# Patient Record
Sex: FEMALE | Race: WHITE | HISPANIC OR LATINO | Employment: UNEMPLOYED | URBAN - METROPOLITAN AREA
[De-identification: names, ages, dates, MRNs, and addresses within clinical notes are randomized per-mention and may not be internally consistent; named-entity substitution may affect disease eponyms.]

---

## 2021-07-05 ENCOUNTER — HOSPITAL ENCOUNTER (EMERGENCY)
Facility: HOSPITAL | Age: 56
Discharge: HOME | End: 2021-07-05
Attending: EMERGENCY MEDICINE
Payer: COMMERCIAL

## 2021-07-05 ENCOUNTER — APPOINTMENT (EMERGENCY)
Dept: RADIOLOGY | Facility: HOSPITAL | Age: 56
End: 2021-07-05
Payer: COMMERCIAL

## 2021-07-05 VITALS
OXYGEN SATURATION: 99 % | RESPIRATION RATE: 18 BRPM | DIASTOLIC BLOOD PRESSURE: 69 MMHG | SYSTOLIC BLOOD PRESSURE: 141 MMHG | HEART RATE: 64 BPM

## 2021-07-05 DIAGNOSIS — S90.121A CONTUSION OF RIGHT LESSER TOE(S) W/O DAMAGE TO NAIL, INIT: Primary | ICD-10-CM

## 2021-07-05 PROCEDURE — 99284 EMERGENCY DEPT VISIT MOD MDM: CPT | Performed by: PHYSICIAN ASSISTANT

## 2021-07-05 PROCEDURE — 99283 EMERGENCY DEPT VISIT LOW MDM: CPT

## 2021-07-05 PROCEDURE — 73620 X-RAY EXAM OF FOOT: CPT

## 2021-07-05 NOTE — ED PROVIDER NOTES
History  Chief Complaint   Patient presents with    Foot Injury     Pt presents to ED after dropping air mattress pump onto right foot second toe at 2300  Patient is a 78-year-old female presents emergency department complaints of right 2nd toe pain  Patient states around 11:00 p m  She dropped at air mattress air pump on her foot  States that she had pain in her right 2nd toe  None       Past Medical History:   Diagnosis Date    Asthma     Diabetes mellitus ([address] )     High cholesterol        Past Surgical History:   Procedure Laterality Date     SECTION         History reviewed  No pertinent family history  I have reviewed and agree with the history as documented  E-Cigarette/Vaping     E-Cigarette/Vaping Substances     Social History     Tobacco Use    Smoking status: Never Smoker    Smokeless tobacco: Never Used   Substance Use Topics    Alcohol use: Not on file    Drug use: Not on file       Review of Systems   Constitutional: Negative for fever  Respiratory: Negative for shortness of breath  Cardiovascular: Negative for chest pain  Gastrointestinal: Negative for abdominal pain  Musculoskeletal: Positive for arthralgias  All other systems reviewed and are negative  Physical Exam  Physical Exam  Vitals reviewed  Constitutional:       Appearance: Normal appearance  HENT:      Head: Normocephalic and atraumatic  Right Ear: Tympanic membrane, ear canal and external ear normal       Left Ear: Tympanic membrane, ear canal and external ear normal       Nose: Nose normal       Mouth/Throat:      Mouth: Mucous membranes are moist    Eyes:      Extraocular Movements: Extraocular movements intact  Conjunctiva/sclera: Conjunctivae normal       Pupils: Pupils are equal, round, and reactive to light  Cardiovascular:      Rate and Rhythm: Normal rate and regular rhythm  Pulses: Normal pulses  Heart sounds: Normal heart sounds     Pulmonary: Effort: Pulmonary effort is normal       Breath sounds: Normal breath sounds  Abdominal:      General: Bowel sounds are normal       Palpations: Abdomen is soft  Musculoskeletal:        Feet:    Skin:     General: Skin is warm  Capillary Refill: Capillary refill takes less than 2 seconds  Neurological:      General: No focal deficit present  Mental Status: She is alert and oriented to person, place, and time  Psychiatric:         Mood and Affect: Mood normal          Behavior: Behavior normal          Thought Content: Thought content normal          Judgment: Judgment normal          Vital Signs  ED Triage Vitals [07/05/21 0032]   Temp Pulse Respirations Blood Pressure SpO2   -- 64 18 141/69 99 %      Temp src Heart Rate Source Patient Position - Orthostatic VS BP Location FiO2 (%)   -- Monitor Sitting Right arm --      Pain Score       --           Vitals:    07/05/21 0032   BP: 141/69   Pulse: 64   Patient Position - Orthostatic VS: Sitting         Visual Acuity      ED Medications  Medications - No data to display    Diagnostic Studies  Results Reviewed     None                 XR foot 2 views RIGHT   ED Interpretation by Albin Eaton PA-C (07/05 0056)   Neg for fx                 Procedures  Procedures         ED Course                             SBIRT 22yo+      Most Recent Value   SBIRT (25 yo +)   In order to provide better care to our patients, we are screening all of our patients for alcohol and drug use  Would it be okay to ask you these screening questions? Yes Filed at: 07/05/2021 1397   Initial Alcohol Screen: US AUDIT-C    1  How often do you have a drink containing alcohol?  0 Filed at: 07/05/2021 0042   2  How many drinks containing alcohol do you have on a typical day you are drinking? 0 Filed at: 07/05/2021 0042   3a  Male UNDER 65: How often do you have five or more drinks on one occasion? 0 Filed at: 07/05/2021 0042   3b  FEMALE Any Age, or MALE 65+:  How often do you have 4 or more drinks on one occassion? 0 Filed at: 07/05/2021 0042   Audit-C Score  0 Filed at: 07/05/2021 3087   SHAHZAD: How many times in the past year have you    Used an illegal drug or used a prescription medication for non-medical reasons? Never Filed at: 07/05/2021 0042                    [facility]  Number of Diagnoses or Management Options  Contusion of right lesser toe(s) w/o damage to nail, init  Diagnosis management comments: Patient is a 17-year-old female presents emergency department complaints of right 2nd toe pain  Patient states around 11:00 p m  She dropped at air mattress air pump on her foot  States that she had pain in her right 2nd toe  On examination right lower extremity, she is neurovascular intact  There is test palpation at the base of the 2nd toe  There is some mild soft tissue swelling noted  Remainder of exam is unremarkable  X-ray images right hand reviewed and does not show any bony abnormality  Findings discussed patient  Recommend Tylenol or Motrin for pain relief  Recommend continued icing  Return parameters were discussed  Patient stable for discharge  Amount and/or Complexity of Data Reviewed  Tests in the radiology section of CPT®: ordered and reviewed    Risk of Complications, Morbidity, and/or Mortality  Presenting problems: low  Diagnostic procedures: low  Management options: low    Patient Progress  Patient progress: stable      Disposition  Final diagnoses:   Contusion of right lesser toe(s) w/o damage to nail, init     Time reflects when diagnosis was documented in both MDM as applicable and the Disposition within this note     Time User Action Codes Description Comment    7/5/2021 12:55 AM Sims Nageotte Add [P80 121A] Contusion of right lesser toe(s) w/o damage to nail, init       ED Disposition     ED Disposition Condition Date/Time Comment    Discharge Good Mon Jul 5, 2021 1200 Grant Jones discharge to home/self care              Follow-up Information     Follow up With Specialties Details Why Contact Info    PCP   If symptoms worsen           There are no discharge medications for this patient  No discharge procedures on file      PDMP Review     None          ED Provider  Electronically Signed by           Yomi Don PA-C  07/05/21 8064

## 2022-12-05 ENCOUNTER — HOSPITAL ENCOUNTER (EMERGENCY)
Facility: HOSPITAL | Age: 57
Discharge: HOME | End: 2022-12-05
Attending: EMERGENCY MEDICINE

## 2022-12-05 ENCOUNTER — APPOINTMENT (EMERGENCY)
Dept: RADIOLOGY | Facility: HOSPITAL | Age: 57
End: 2022-12-05

## 2022-12-05 VITALS
SYSTOLIC BLOOD PRESSURE: 125 MMHG | TEMPERATURE: 98 F | OXYGEN SATURATION: 99 % | BODY MASS INDEX: 27.56 KG/M2 | RESPIRATION RATE: 14 BRPM | DIASTOLIC BLOOD PRESSURE: 67 MMHG | WEIGHT: 146 LBS | HEIGHT: 61 IN | HEART RATE: 59 BPM

## 2022-12-05 DIAGNOSIS — R00.2 PALPITATIONS: Primary | ICD-10-CM

## 2022-12-05 LAB
ANION GAP SERPL CALCULATED.3IONS-SCNC: 2 MMOL/L (ref 4–13)
BASOPHILS # BLD AUTO: 0.04 THOUSANDS/ÂΜL (ref 0–0.1)
BASOPHILS NFR BLD AUTO: 1 % (ref 0–1)
BUN SERPL-MCNC: 18 MG/DL (ref 5–25)
CALCIUM SERPL-MCNC: 9.6 MG/DL (ref 8.3–10.1)
CARDIAC TROPONIN I PNL SERPL HS: <2 NG/L
CHLORIDE SERPL-SCNC: 100 MMOL/L (ref 96–108)
CO2 SERPL-SCNC: 30 MMOL/L (ref 21–32)
CREAT SERPL-MCNC: 0.71 MG/DL (ref 0.6–1.3)
EOSINOPHIL # BLD AUTO: 0.15 THOUSAND/ÂΜL (ref 0–0.61)
EOSINOPHIL NFR BLD AUTO: 3 % (ref 0–6)
ERYTHROCYTE [DISTWIDTH] IN BLOOD BY AUTOMATED COUNT: 12.2 % (ref 11.6–15.1)
GFR SERPL CREATININE-BSD FRML MDRD: 93 ML/MIN/1.73SQ M
GLUCOSE SERPL-MCNC: 147 MG/DL (ref 65–140)
HCT VFR BLD AUTO: 40.1 % (ref 34.8–46.1)
HGB BLD-MCNC: 13.1 G/DL (ref 11.5–15.4)
IMM GRANULOCYTES # BLD AUTO: 0.01 THOUSAND/UL (ref 0–0.2)
IMM GRANULOCYTES NFR BLD AUTO: 0 % (ref 0–2)
LYMPHOCYTES # BLD AUTO: 2.35 THOUSANDS/ÂΜL (ref 0.6–4.47)
LYMPHOCYTES NFR BLD AUTO: 42 % (ref 14–44)
MCH RBC QN AUTO: 30.9 PG (ref 26.8–34.3)
MCHC RBC AUTO-ENTMCNC: 32.7 G/DL (ref 31.4–37.4)
MCV RBC AUTO: 95 FL (ref 82–98)
MONOCYTES # BLD AUTO: 0.44 THOUSAND/ÂΜL (ref 0.17–1.22)
MONOCYTES NFR BLD AUTO: 8 % (ref 4–12)
NEUTROPHILS # BLD AUTO: 2.57 THOUSANDS/ÂΜL (ref 1.85–7.62)
NEUTS SEG NFR BLD AUTO: 46 % (ref 43–75)
NRBC BLD AUTO-RTO: 0 /100 WBCS
PLATELET # BLD AUTO: 204 THOUSANDS/UL (ref 149–390)
PMV BLD AUTO: 12.8 FL (ref 8.9–12.7)
POTASSIUM SERPL-SCNC: 3.5 MMOL/L (ref 3.5–5.3)
RBC # BLD AUTO: 4.24 MILLION/UL (ref 3.81–5.12)
SODIUM SERPL-SCNC: 132 MMOL/L (ref 135–147)
TSH SERPL DL<=0.05 MIU/L-ACNC: 5.53 UIU/ML (ref 0.45–4.5)
WBC # BLD AUTO: 5.56 THOUSAND/UL (ref 4.31–10.16)

## 2022-12-05 RX ORDER — SODIUM CHLORIDE 9 MG/ML
3 INJECTION INTRAVENOUS
Status: DISCONTINUED | OUTPATIENT
Start: 2022-12-05 | End: 2022-12-06 | Stop reason: HOSPADM

## 2022-12-06 LAB
ATRIAL RATE: 67 BPM
ATRIAL RATE: 71 BPM
P AXIS: 74 DEGREES
P AXIS: 77 DEGREES
PR INTERVAL: 158 MS
PR INTERVAL: 164 MS
QRS AXIS: 72 DEGREES
QRS AXIS: 79 DEGREES
QRSD INTERVAL: 74 MS
QRSD INTERVAL: 74 MS
QT INTERVAL: 408 MS
QT INTERVAL: 546 MS
QTC INTERVAL: 431 MS
QTC INTERVAL: 593 MS
T WAVE AXIS: 77 DEGREES
T WAVE AXIS: 89 DEGREES
VENTRICULAR RATE: 67 BPM
VENTRICULAR RATE: 71 BPM

## 2022-12-06 NOTE — ED PROVIDER NOTES
History  Chief Complaint   Patient presents with   • Palpitations     Pt reports she started with palpitations intermittently 2 weeks ago, pt reports they got worse today, also has SOB with the episodes of palpitations  Denies chest pain     57-year-old female presenting here with a chief complaint of palpitations intermittently over the last 2 weeks  No shortness of breath or chest pain associated with this  No nausea no vomiting no diaphoresis  None       Past Medical History:   Diagnosis Date   • Asthma    • Diabetes mellitus ([facility] [address] )    • High cholesterol        Past Surgical History:   Procedure Laterality Date   •  SECTION         History reviewed  No pertinent family history  I have reviewed and agree with the history as documented  E-Cigarette/Vaping     E-Cigarette/Vaping Substances     Social History     Tobacco Use   • Smoking status: Never   • Smokeless tobacco: Never   Substance Use Topics   • Alcohol use: Not Currently   • Drug use: Not Currently       Review of Systems   Constitutional: Negative for diaphoresis, fatigue and fever  HENT: Negative for congestion, ear pain, nosebleeds and sore throat  Eyes: Negative for photophobia, pain, discharge and visual disturbance  Respiratory: Negative for cough, choking, chest tightness, shortness of breath and wheezing  Cardiovascular: Positive for palpitations  Negative for chest pain  Gastrointestinal: Negative for abdominal distention, abdominal pain, diarrhea and vomiting  Genitourinary: Negative for dysuria, flank pain and frequency  Musculoskeletal: Negative for back pain, gait problem and joint swelling  Skin: Negative for color change and rash  Neurological: Negative for dizziness, syncope and headaches  Psychiatric/Behavioral: Negative for behavioral problems and confusion  The patient is not nervous/anxious  All other systems reviewed and are negative        Physical Exam  Physical Exam  Vitals and nursing note reviewed  Constitutional:       General: She is not in acute distress  Appearance: She is well-developed and well-nourished  She is not ill-appearing, toxic-appearing or sickly-appearing  HENT:      Head: Normocephalic and atraumatic  Mouth/Throat:      Mouth: Mucous membranes are normal       Dentition: Normal dentition  Eyes:      General:         Right eye: No discharge  Left eye: No discharge  Extraocular Movements: EOM normal    Cardiovascular:      Rate and Rhythm: Normal rate and regular rhythm  Pulmonary:      Effort: Pulmonary effort is normal  No accessory muscle usage or respiratory distress  Abdominal:      General: There is no distension  Tenderness: There is no guarding  Musculoskeletal:         General: No edema  Normal range of motion  Cervical back: Normal range of motion and neck supple  Skin:     General: Skin is warm and dry  Neurological:      Mental Status: She is alert and oriented to person, place, and time  Coordination: Coordination normal    Psychiatric:         Mood and Affect: Mood and affect normal          Behavior: Behavior is cooperative           Vital Signs  ED Triage Vitals [12/05/22 2034]   Temperature Pulse Respirations Blood Pressure SpO2   98 °F (36 7 °C) 71 18 144/66 100 %      Temp Source Heart Rate Source Patient Position - Orthostatic VS BP Location FiO2 (%)   Tympanic Monitor Sitting Left arm --      Pain Score       --           Vitals:    12/05/22 2034 12/05/22 2130   BP: 144/66 125/67   Pulse: 71 59   Patient Position - Orthostatic VS: Sitting Sitting         Visual Acuity      ED Medications  Medications   sodium chloride (PF) 0 9 % injection 3 mL (has no administration in time range)       Diagnostic Studies  Results Reviewed     Procedure Component Value Units Date/Time    HS Troponin I 4hr [[id number]]     Lab Status: No result Specimen: Blood     HS Troponin 0hr (reflex protocol) [[id number]]  (Normal) Collected: 12/05/22 2117    Lab Status: Final result Specimen: Blood from Arm, Left Updated: 12/05/22 2149     hs TnI 0hr <2 ng/L     HS Troponin I 2hr [[id number]]     Lab Status: No result Specimen: Blood     Basic metabolic panel [[id number]]  (Abnormal) Collected: 12/05/22 2117    Lab Status: Final result Specimen: Blood from Arm, Left Updated: 12/05/22 2149     Sodium 132 mmol/L      Potassium 3 5 mmol/L      Chloride 100 mmol/L      CO2 30 mmol/L      ANION GAP 2 mmol/L      BUN 18 mg/dL      Creatinine 0 71 mg/dL      Glucose 147 mg/dL      Calcium 9 6 mg/dL      eGFR 93 ml/min/1 73sq m     Narrative:      [place] guidelines for Chronic Kidney Disease (CKD):   •  Stage 1 with normal or high GFR (GFR > 90 mL/min/1 73 square meters)  •  Stage 2 Mild CKD (GFR = 60-89 mL/min/1 73 square meters)  •  Stage 3A Moderate CKD (GFR = 45-59 mL/min/1 73 square meters)  •  Stage 3B Moderate CKD (GFR = 30-44 mL/min/1 73 square meters)  •  Stage 4 Severe CKD (GFR = 15-29 mL/min/1 73 square meters)  •  Stage 5 End Stage CKD (GFR <15 mL/min/1 73 square meters)  Note: GFR calculation is accurate only with a steady state creatinine    TSH [[id number]]  (Abnormal) Collected: 12/05/22 2117    Lab Status: Final result Specimen: Blood from Arm, Left Updated: 12/05/22 2149     TSH 3RD [place] Rad 5 530 uIU/mL     Narrative:      Patients undergoing fluorescein dye angiography may retain small amounts of fluorescein in the body for 48-72 hours post procedure  Samples containing fluorescein can produce falsely depressed TSH values  If the patient had this procedure,a specimen should be resubmitted post fluorescein clearance        CBC and differential [[id number]]  (Abnormal) Collected: 12/05/22 2117    Lab Status: Final result Specimen: Blood from Arm, Left Updated: 12/05/22 2124     WBC 5 56 Thousand/uL      RBC 4 24 Million/uL      Hemoglobin 13 1 g/dL      Hematocrit 40 1 %      MCV 95 fL      MCH 30 9 pg MCHC 32 7 g/dL      RDW 12 2 %      MPV 12 8 fL      Platelets 201 Thousands/uL      nRBC 0 /100 WBCs      Neutrophils Relative 46 %      Immat GRANS % 0 %      Lymphocytes Relative 42 %      Monocytes Relative 8 %      Eosinophils Relative 3 %      Basophils Relative 1 %      Neutrophils Absolute 2 57 Thousands/µL      Immature Grans Absolute 0 01 Thousand/uL      Lymphocytes Absolute 2 35 Thousands/µL      Monocytes Absolute 0 44 Thousand/µL      Eosinophils Absolute 0 15 Thousand/µL      Basophils Absolute 0 04 Thousands/µL                  X-ray chest 1 view portable    (Results Pending)              Procedures  Procedures         ED Course             HEART Risk Score    Flowsheet Row Most Recent Value   Heart Score Risk Calculator    History 0 Filed at: 12/05/2022 2328   ECG 0 Filed at: 12/05/2022 2328   Age 1 Filed at: 12/05/2022 2328   Risk Factors 0 Filed at: 12/05/2022 2328   Troponin 0 Filed at: 12/05/2022 2328   HEART Score 1 Filed at: 12/05/2022 2328                                      MDM  Number of Diagnoses or Management Options  Palpitations: new and requires workup     Amount and/or Complexity of Data Reviewed  Clinical lab tests: ordered and reviewed  Tests in the radiology section of CPT®: ordered and reviewed  Independent visualization of images, tracings, or specimens: yes    Patient Progress  Patient progress: stable      Disposition  Final diagnoses:   Palpitations     Time reflects when diagnosis was documented in both MDM as applicable and the Disposition within this note     Time User Action Codes Description Comment    12/5/2022  9:56 PM Rich Iglesias Add [R00 2] Palpitations       ED Disposition     ED Disposition   Discharge    Condition   Stable    Date/Time   Mon Dec 5, 2022  9:56 PM    Reny Hernandez discharge to home/self care                 Follow-up Information     Follow up With Specialties Details Why Contact Info Additional Information    ZEE Bueno  Schedule an appointment as soon as possible for a visit  For Continued Evaluation [address]  [facility] [address]  [phone]       [address] [facility] Emergency Department Emergency Medicine  As needed [address] Emergency Department, [address] [place] [address], [facility]          There are no discharge medications for this patient            PDMP Review     None          ED Provider  Electronically Signed by           AUDREY Roberson  12/05/22 5486

## 2024-09-09 ENCOUNTER — OFFICE VISIT (OUTPATIENT)
Age: 59
End: 2024-09-09
Payer: COMMERCIAL

## 2024-09-09 VITALS
BODY MASS INDEX: 27.96 KG/M2 | HEART RATE: 67 BPM | SYSTOLIC BLOOD PRESSURE: 125 MMHG | WEIGHT: 148 LBS | RESPIRATION RATE: 18 BRPM | DIASTOLIC BLOOD PRESSURE: 56 MMHG | TEMPERATURE: 97.4 F | OXYGEN SATURATION: 100 %

## 2024-09-09 DIAGNOSIS — J45.21 MILD INTERMITTENT ASTHMA WITH ACUTE EXACERBATION: Primary | ICD-10-CM

## 2024-09-09 PROCEDURE — 99213 OFFICE O/P EST LOW 20 MIN: CPT | Performed by: PHYSICIAN ASSISTANT

## 2024-09-09 RX ORDER — FLUTICASONE PROPIONATE 110 UG/1
2 AEROSOL, METERED RESPIRATORY (INHALATION) 2 TIMES DAILY
Qty: 36 G | Refills: 3 | Status: SHIPPED | OUTPATIENT
Start: 2024-09-09

## 2024-09-09 RX ORDER — OLOPATADINE HYDROCHLORIDE 1 MG/ML
1 SOLUTION/ DROPS OPHTHALMIC 2 TIMES DAILY
COMMUNITY
Start: 2024-05-08 | End: 2025-05-08

## 2024-09-09 RX ORDER — FENOFIBRATE 145 MG/1
145 TABLET, COATED ORAL DAILY
COMMUNITY
Start: 2024-05-08

## 2024-09-09 RX ORDER — CETIRIZINE HYDROCHLORIDE 10 MG/1
10 TABLET ORAL DAILY
COMMUNITY
Start: 2024-05-08 | End: 2025-05-08

## 2024-09-09 RX ORDER — ALBUTEROL SULFATE 90 UG/1
2 AEROSOL, METERED RESPIRATORY (INHALATION) EVERY 6 HOURS PRN
Qty: 8 G | Refills: 3 | Status: SHIPPED | OUTPATIENT
Start: 2024-09-09

## 2024-09-09 RX ORDER — METFORMIN HCL 500 MG
1000 TABLET, EXTENDED RELEASE 24 HR ORAL
COMMUNITY
Start: 2024-08-20 | End: 2025-08-20

## 2024-09-09 RX ORDER — MELOXICAM 15 MG/1
TABLET ORAL EVERY 24 HOURS
COMMUNITY

## 2024-09-09 RX ORDER — FLUTICASONE PROPIONATE AND SALMETEROL 250; 50 UG/1; UG/1
1 POWDER RESPIRATORY (INHALATION)
COMMUNITY
Start: 2024-08-20

## 2024-09-09 RX ORDER — METHYLPREDNISOLONE 4 MG
TABLET, DOSE PACK ORAL
Qty: 21 TABLET | Refills: 0 | Status: SHIPPED | OUTPATIENT
Start: 2024-09-09

## 2024-09-09 RX ORDER — GLIMEPIRIDE 1 MG/1
1 TABLET ORAL
COMMUNITY
Start: 2024-05-16 | End: 2025-05-16

## 2024-09-09 RX ORDER — ALBUTEROL SULFATE 90 UG/1
2 AEROSOL, METERED RESPIRATORY (INHALATION) EVERY 6 HOURS PRN
COMMUNITY

## 2024-09-09 NOTE — PATIENT INSTRUCTIONS
"Patient Education     Asthma in adults   The Basics   Written by the doctors and editors at [facility]   What is asthma? -- Asthma is a condition that can make it hard to breathe. Asthma symptoms can be mild or severe. They can come and go. An asthma \"attack\" is when symptoms start suddenly. This happens when the airways in the lungs become more narrow and inflamed (figure 1).  Asthma can run in families.  What are the symptoms of asthma? -- Asthma symptoms can include:   Wheezing or noisy breathing   Coughing   Tight feeling in the chest   Shortness of breath  Symptoms can happen each day, each week, or less often. Symptoms can range from mild to severe. Although it is rare, an asthma attack can sometimes even lead to death.  Is there a test for asthma? -- Yes. Your doctor will ask you about your symptoms and have you do a breathing test to check how your lungs are working.  If your doctor thinks that allergies might be making your asthma worse, they might suggest allergy testing. This can include skin tests or blood tests.  How is asthma treated? -- Asthma is treated with different types of medicines. The medicines can be inhalers, liquids, or pills. Your doctor will prescribe medicine based on how often you have symptoms and how serious your symptoms are. There are 2 main types of asthma medicines:   Quick-relief medicines stop symptoms quickly, in 5 to 15 minutes. Almost everyone with asthma carries a quick-relief inhaler with them. People use these medicines whenever they have asthma symptoms. Most people need these medicines 1 or 2 times a week, or less often. But when asthma symptoms get worse, more doses might be needed.   Long-term controller medicines control asthma and help prevent future attacks. People who get asthma symptoms more than 2 times a week should use a controller medicine every day.  Some medicines can work as both a controller medicine and a quick-relief medicine. These are taken once or twice " "a day as controller medicines. They can also be used for quick relief.  It is very important to take all of the medicines the doctor prescribes, exactly how you are supposed to take them. You might have to take medicines a few times a day. Your doctor, nurse, or pharmacist will show you the right way to use your inhaler(s).  If your symptoms get much worse all of a sudden, use your quick-relief medicine and contact your doctor or nurse. You might need to go to the hospital for treatment.  What is an asthma action plan? -- An asthma action plan is a list of instructions that tell you (form 1):   Which medicines to use each day at home   Which medicines to take if your symptoms get worse   When to get help or call for an ambulance  If you have frequent or severe asthma symptoms, your doctor might suggest that you have an asthma action plan. If so, you and your doctor will work together to make one. As part of your action plan, you might need to use something called a \"peak flow meter.\" Breathing into this device will show how your lungs are working. Your doctor will show you the right way to use your peak flow meter.  Can asthma symptoms be prevented? -- There are things that you can do to help prevent asthma attacks. Your doctor or nurse can talk to you about what is most important for you.  In general, you can:   Avoid \"triggers\" - These are things that make your symptoms worse. Common triggers include smoke, air pollution, dust, mold, pollen, strong chemicals or smells, and very cold or dry air. For some people, being around certain animals can trigger symptoms. Exercise and stress can also be triggers.  Some adults with asthma have worse symptoms if they take aspirin or medicines called NSAIDs. NSAIDs include ibuprofen (sample brand names: Advil, Motrin) and naproxen (sample brand names: Aleve, Naprosyn). Ask your doctor if you need to avoid these medicines.  If you can't avoid certain triggers, talk with your " "doctor about what you can do. For example, you might need to take an extra dose of your quick-relief inhaler medicine before you exercise or are around things you are allergic to.   Lower your risk of getting sick - Some infections can make asthma symptoms worse. These include the common cold, the flu, and coronavirus disease 2019 (\"COVID-19\").  It's important to get the COVID-19 vaccine. This will lower the risk of severe illness if you do get COVID-19. You should also get a flu shot every year. Plus, some people need to get a vaccine to help prevent pneumonia.  If you think that you might have an infection, tell your doctor or nurse. They can help you figure out if you need treatment.   Make sure that you know how and when to take your medicines - If you take controller medicines, follow all instructions to help prevent symptoms. You should also make sure that you know how and when to use your quick-relief medicine.   See your doctor or nurse regularly - If you need asthma medicine every day, you should see your doctor or nurse at least every 6 months. At these appointments, they will ask about your symptoms, check how well your lungs are working, and talk about your treatment plan.  What if I want to get pregnant? -- If you want to get pregnant, talk to your doctor about how to control your asthma. Keeping your asthma well controlled is important for the health of your baby. Most asthma medicines are safe to take if you are pregnant.  When should I call the doctor? -- Call for an ambulance (in the [place] and [place], call 9-1-1) if you have severe symptoms like:   You have so much trouble breathing that you cannot talk.   Your lips or fingernails turn gray or blue.  Call your doctor or nurse if:   You have an asthma attack and the symptoms do not improve, or get worse, after using a quick-relief medicine.   You need to use your quick-relief medicine more than 2 times a week.   You cannot do your normal activities " because of your asthma symptoms.   You have any questions about your medicines.  All topics are updated as new evidence becomes available and our peer review process is complete.  This topic retrieved from [employer] on: Mar 29, 2024.  Topic 52061 Version 24.0  Release: 32.2.4 - C32.87  © 2024 UpToDate, Inc. and/or its affiliates. All rights reserved.  figure 1: Child with asthma     During an asthma attack or flare-up, the muscles around the airways tighten (constrict), and the lining of the airways gets inflamed. Then, mucus builds up. All of this makes it hard to breathe.  Graphic 81023 Version 10.0  form 1: Asthma action plan (adult)     Graphic 962757 Version 1.0  Consumer Information Use and Disclaimer   Disclaimer: This generalized information is a limited summary of diagnosis, treatment, and/or medication information. It is not meant to be comprehensive and should be used as a tool to help the user understand and/or assess potential diagnostic and treatment options. It does NOT include all information about conditions, treatments, medications, side effects, or risks that may apply to a specific patient. It is not intended to be medical advice or a substitute for the medical advice, diagnosis, or treatment of a health care provider based on the health care provider's examination and assessment of a patient's specific and unique circumstances. Patients must speak with a health care provider for complete information about their health, medical questions, and treatment options, including any risks or benefits regarding use of medications. This information does not endorse any treatments or medications as safe, effective, or approved for treating a specific patient. UpToDate, Inc. and its affiliates disclaim any warranty or liability relating to this information or the use thereof.The use of this information is governed by the Terms of Use, available at  https://www.[employer].com/en/know/clinical-effectiveness-terms. 2024© [employer] and its affiliates and/or licensors. All rights reserved.  Copyright   © 2024 [employer] and/or its affiliates. All rights reserved.

## 2024-09-09 NOTE — PROGRESS NOTES
[facility]        NAME: Fioldaliza Reyes is a 59 y.o. female  : 1965    MRN: [id number]  DATE: 2024  TIME: 2:16 PM    Assessment and Plan   Mild intermittent asthma with acute exacerbation [J45.21]  1. Mild intermittent asthma with acute exacerbation  fluticasone (Flovent HFA) 110 MCG/ACT inhaler    albuterol (Proventil HFA) 90 mcg/act inhaler    methylPREDNISolone 4 MG tablet therapy pack            Patient Instructions     Vital signs are normal and stable.  Pulse ox at 100%.    Refill albuterol inhalers  Flovent inhaler 2 puffs twice daily  Coupon provided    Medrol pack as directed    Follow up with PCP in 3-5 days.  Proceed to  ER if symptoms worsen.    Chief Complaint     Chief Complaint   Patient presents with    Shortness of Breath     Pt states she has asthma and is having a cough and shortness of breath for 5 days          History of Present Illness       59-year-old female with past medical history of mild intermitting asthma is presenting today with complaint of coughing, wheezing and shortness of breath on occasion and intermitting for the last 2 weeks.  Patient also is having difficulty establishing medical insurance and thus, not able to obtain her Advair.  He has been using her albuterol inhaler.  She finally discovered her nebulizer and has been utilizing as well.  Patient is sitting comfortably and in no acute distress speaking in sensible sentences.        Review of Systems   Review of Systems   Constitutional:  Negative for activity change, appetite change, chills and fever.   HENT:  Positive for rhinorrhea and sneezing. Negative for congestion.    Respiratory:  Positive for cough, chest tightness, shortness of breath and wheezing.    Cardiovascular:  Negative for chest pain and palpitations.   Gastrointestinal:  Negative for abdominal pain, nausea and vomiting.   Musculoskeletal:  Negative for myalgias.   Skin:  Negative for rash.   Neurological:  Negative for  dizziness, weakness, light-headedness and headaches.         Current Medications       Current Outpatient Medications:     albuterol (Proventil HFA) 90 mcg/act inhaler, Inhale 2 puffs every 6 (six) hours as needed for wheezing or shortness of breath, Disp: 8 g, Rfl: 3    albuterol (PROVENTIL HFA,VENTOLIN HFA) 90 mcg/act inhaler, Inhale 2 puffs every 6 (six) hours as needed, Disp: , Rfl:     cetirizine (ZyrTEC Allergy) 10 mg tablet, Take 10 mg by mouth daily, Disp: , Rfl:     fenofibrate (TRICOR) 145 mg tablet, Take 145 mg by mouth daily, Disp: , Rfl:     fluticasone (Flovent HFA) 110 MCG/ACT inhaler, Inhale 2 puffs 2 (two) times a day Rinse mouth after use., Disp: 36 g, Rfl: 3    Fluticasone-Salmeterol (Advair Diskus) 250-50 mcg/dose inhaler, Inhale 1 puff, Disp: , Rfl:     glimepiride (AMARYL) 1 mg tablet, Take 1 mg by mouth, Disp: , Rfl:     meloxicam (MOBIC) 15 mg tablet, every 24 hours, Disp: , Rfl:     metFORMIN (GLUCOPHAGE-XR) 500 mg 24 hr tablet, Take 1,000 mg by mouth, Disp: , Rfl:     methylPREDNISolone 4 MG tablet therapy pack, Use as directed on package, Disp: 21 tablet, Rfl: 0    olopatadine (PATANOL) 0.1 % ophthalmic solution, Apply 1 drop to eye 2 (two) times a day, Disp: , Rfl:     Current Allergies     Allergies as of 2024 - Reviewed 2024   Allergen Reaction Noted    Niacin Rash 2021    Statins Rash 2021            The following portions of the patient's history were reviewed and updated as appropriate: allergies, current medications, past family history, past medical history, past social history, past surgical history and problem list.     Past Medical History:   Diagnosis Date    Asthma     Diabetes mellitus (HCC)     High cholesterol        Past Surgical History:   Procedure Laterality Date     SECTION         History reviewed. No pertinent family history.      Medications have been verified.        Objective   /56   Pulse 67   Temp (!) 97.4 °F (36.3 °C)    Resp 18   Wt 67.1 kg (148 lb)   SpO2 100%   BMI 27.96 kg/m²        Physical Exam     Physical Exam  Vitals and nursing note reviewed.   Constitutional:       General: She is not in acute distress.     Appearance: She is well-developed and normal weight. She is not ill-appearing, toxic-appearing or diaphoretic.   HENT:      Mouth/Throat:      Mouth: Mucous membranes are moist.   Eyes:      Extraocular Movements: Extraocular movements intact.      Pupils: Pupils are equal, round, and reactive to light.   Neck:      Thyroid: No thyromegaly.      Trachea: No tracheal deviation.   Cardiovascular:      Rate and Rhythm: Normal rate and regular rhythm.      Pulses: Normal pulses.   Pulmonary:      Effort: Pulmonary effort is normal.      Breath sounds: Wheezing present. No decreased breath sounds, rhonchi or rales.   Musculoskeletal:         General: Normal range of motion.      Cervical back: Normal range of motion and neck supple.   Skin:     General: Skin is warm and dry.   Neurological:      General: No focal deficit present.      Mental Status: She is alert and oriented to person, place, and time.   Psychiatric:         Mood and Affect: Mood normal.         Behavior: Behavior normal.